# Patient Record
Sex: MALE | Race: WHITE | NOT HISPANIC OR LATINO | URBAN - METROPOLITAN AREA
[De-identification: names, ages, dates, MRNs, and addresses within clinical notes are randomized per-mention and may not be internally consistent; named-entity substitution may affect disease eponyms.]

---

## 2021-03-03 NOTE — IMPRESSION/PLAN
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
s/p focal laser OD Plan: He complains of worse vision OD. However, he's doing very well today. He has had recurrent CME OD after 9 weeks in the past. OCT confirms no edema OU. We discussed the findings and natural history. Based on today's findings, I recommend treatment to reduce the risk of vision loss. He is not interested in clinical trials. Eylea injected OU without complication after discussing the R/CAMPOS/ in detail. We will avoid 9 weeks for now. thanks [place] RTC 8 weeks  OCT OU; eylea OU

## 2021-06-23 NOTE — IMPRESSION/PLAN
Impression: Type 2 diabetes mellitus w/ moderate nonproliferative diabetic retinopathy w/ macular edema of bilateral eyes: S83.4242.
s/p focal laser OD Plan: He feels his vision is unchanged. He's doing very well today. He has had recurrent CME OD after 9 weeks in the past. OCT confirms no edema OU. We discussed the findings and natural history. I recommend treatment to reduce the risk of vision loss. He is not interested in clinical trials. Eylea injected OU without complication after discussing the R/CAMPOS/ in detail. He wants to try to extend the interval again. [facility]  [facility]  [facility]U; poss eylea OU

## 2021-08-25 NOTE — IMPRESSION/PLAN
Impression: Type 2 diabetes mellitus w/ moderate nonproliferative diabetic retinopathy w/ macular edema of bilateral eyes: C95.8961.
s/p focal laser OD Plan: He complains of worse vision OD and has slight recurrent CME OD after 9 weeks. He has had recurrent CME OD after 9 weeks in the past as well. OCT confirms mild CME OD and no edema OS. We discussed the findings and natural history. Based on today's findings, I recommend treatment to reduce the risk of vision loss. Eylea injected OU without complication after discussing the R/CAMPOS/ in detail. We will avoid 9 weeks for now. thanks [place] RTC 7-8  weeks OCT OU; eylea OU

## 2021-12-15 NOTE — IMPRESSION/PLAN
Impression: Type 2 diabetes mellitus w/ moderate nonproliferative diabetic retinopathy w/ macular edema of bilateral eyes: W42.5637.
s/p focal laser OD Plan: He complains of blurry vision OD. However, he's doing very well from a retina standpoint. He has had recurrent CME OD after 9 weeks in the past. OCT confirms trace CME OD and no edema OS. We discussed the findings and natural history. Based on today's findings, I recommend treatment to reduce the risk of vision loss. Eylea injected OU without complication after discussing the R/CAMPOS/ in detail. We will avoid 9 weeks for now. He will see you about his cataracts and ocular hypertension. thanks Moe [facility] 7-8  weeks OCT OU; eylea OU

## 2022-04-06 NOTE — IMPRESSION/PLAN
Impression: Type 2 diabetes mellitus w/ moderate nonproliferative diabetic retinopathy w/ macular edema of bilateral eyes: F52.9695.
s/p focal laser OD Plan: He feels his vision is unchanged. He has had recurrent CME OD after 9 weeks in the past but wants to try to extend again. OCT confirms no edema OU. We discussed the findings and natural history. I recommend treatment to reduce the risk of vision loss. Eylea injected OU without complication after discussing the R/CAMPOS/ in detail. He will see you about his cataracts and ocular hypertension. thanks Moe [facility] 9 weeks OCT OU; poss eylea OU

## 2022-06-08 NOTE — IMPRESSION/PLAN
Impression: Type 2 diabetes mellitus w/ moderate nonproliferative diabetic retinopathy w/ macular edema of bilateral eyes: D84.3603.
s/p focal laser OD Plan: He complains his vision OU is filmy. However, he continues to do well. He has had recurrent CME OD after 9 weeks in the past but his last injection was 9 weeks ago, and he wants to try to extend again. OCT confirms no edema OU. We discussed the findings and natural history. Based on today's findigns, I recommend treatment to reduce the risk of vision loss. Eylea injected OU without complication after discussing the R/CAMPOS/ in detail. He will see you about his cataracts
thanks Moe [facility] 10 weeks OCT OU; poss eylea OU

## 2022-08-10 NOTE — IMPRESSION/PLAN
Impression: Type 2 diabetes mellitus w/ moderate nonproliferative diabetic retinopathy w/ macular edema of bilateral eyes: C46.5332.
s/p focal laser OD Plan: He feels his vision is stable. He has had recurrent CME OD after 9 weeks in the past but his last two injections were 9 weeks ago, and he wants to try to extend again. OCT confirms no edema OU. We discussed the findings and natural history. I recommend treatment to reduce the risk of vision loss. Eylea injected OU without complication after discussing the R/CAMPOS/ in detail. He will see you about his cataracts vs refraction
thanks Moe [facility] 10-11 weeks OCT OU; poss eylea OU

## 2022-10-19 NOTE — IMPRESSION/PLAN
Impression: Type 2 diabetes mellitus w/ moderate nonproliferative diabetic retinopathy w/ macular edema of bilateral eyes: C04.0516.
s/p focal laser OD Plan: He complains of blurry vision OU in the mornings and difficulty seeing faces. He has slight recurrent edema OS today after 10 weeks. He has had recurrent CME OD after 9 weeks in the past. OCT confirms trace edema OD and mild edema OS. We discussed the findings and natural history. Based on today's findings, I recommend treatment to reduce the risk of vision loss. Eylea injected OU without complication after discussing the R/CAMPOS/ in detail. He will see you about his cataracts vs refraction
thanks Moe [facility] 2 months OCT OU; sade OU

## 2023-01-09 ENCOUNTER — OFFICE VISIT (OUTPATIENT)
Dept: URBAN - METROPOLITAN AREA CLINIC 42 | Facility: CLINIC | Age: 63
End: 2023-01-09
Payer: MEDICAID

## 2023-01-09 DIAGNOSIS — H25.813 COMBINED FORMS OF AGE-RELATED CATARACT, BILATERAL: ICD-10-CM

## 2023-01-09 DIAGNOSIS — H52.13 MYOPIA, BILATERAL: ICD-10-CM

## 2023-01-09 DIAGNOSIS — E11.3313 TYPE 2 DIABETES MELLITUS W/ MODERATE NONPROLIFERATIVE DIABETIC RETINOPATHY W/ MACULAR EDEMA OF BILATERAL EYES: Primary | ICD-10-CM

## 2023-01-09 PROCEDURE — 99204 OFFICE O/P NEW MOD 45 MIN: CPT

## 2023-01-09 ASSESSMENT — VISUAL ACUITY
OS: 20/30
OD: 20/40

## 2023-01-09 ASSESSMENT — INTRAOCULAR PRESSURE
OS: 14
OD: 14

## 2023-01-09 NOTE — IMPRESSION/PLAN
Impression: Combined forms of age-related cataract, bilateral: H25.813. Plan: Patient educated on condition. Significant decrease in vision and difficulty with activities of daily living. Risks and benefits of surgery vs waiting discussed. Recommend surgery OU. Patient is a candidate for/interested in monofocal, astigmatism correction, toric IOL, LENSX, ORA. Outcomes of surgery limitations include: h/o DME Schedule Cataract Evaluation with Dr. Yudith Zhou.

## 2023-01-09 NOTE — IMPRESSION/PLAN
Impression: Type 2 diabetes mellitus w/ moderate nonproliferative diabetic retinopathy w/ macular edema of bilateral eyes: Z59.8440. Plan: Patient educated on condition. Informed that DM is the #1 form of preventable blindness in the [address]. Continue strict blood sugar control with PCP. Continue retina care with [facility].

## 2023-02-06 NOTE — IMPRESSION/PLAN
Impression: Combined forms of age-related cataract, bilateral: H25.813. Plan: Discussed cataract surgery diagnosis with patient. Discussed risk and benefits Including infection, retinal detachment, droopy eye lid, swelling, bleeding, loss of vision and double vision. Patient understands may still need glasses for best corrected vision. Recommend OD first then OS. Candidate for LenSx, ORA and standard IOL only, if pt wishes. Non-candidate for multifocal IOL due to retina pathology OU, s/p eyelea inj OU. OD AIM: PLANO         OS AIM: PLANO Pt will need gtts upon decision, next appt with counselor. Pt will need NSAID gtts before and after sx. IOL master, Arthur Labor, [place] and OCT MAC were completed today.

## 2023-02-06 NOTE — IMPRESSION/PLAN
Impression: Type 2 diabetes mellitus w/ moderate nonproliferative diabetic retinopathy w/ macular edema of bilateral eyes: V06.6845. Plan: Diabetes type II: moderate background diabetic retinopathy, no signs of neovascularization noted. Will refer patient for a retinal consult to determine if treatment is necessary. Discussed ocular and systemic benefits of maintaining blood sugar control. Continue Retina care with [facility].

## 2023-03-02 NOTE — IMPRESSION/PLAN
Impression: S/P 44788 Cataract Extraction by phacoemulsification with IOL placement; TRYPAN OD - 1 Day. Encounter for surgical aftercare following surgery on a sense organ  Z48.810. Post operative instructions reviewed - Condition is improving - Plan: --Continue Prednisolone acetate 1%, Ketorolac and Ofloxacin as directed. Explained to pt and wife gtts instructions.

## 2023-03-06 NOTE — IMPRESSION/PLAN
Impression: Combined forms of age-related cataract, left eye: H25.812. Plan: Advised patient of condition. Risks, benefits, and alternatives of cataract surgery discussed. Patient elects to proceed Patient elects to proceed with 2nd eye cataract extraction, OS. Patient elects to proceed with ALL standard procedure and standard IOL OS Target PLANO OS Pt has Ofloxacin and Prednisolone, still waiting Ketorolac.

## 2023-03-06 NOTE — IMPRESSION/PLAN
Impression: S/P 73695 Cataract Extraction by phacoemulsification with IOL placement; TRYPAN OD - 5 Days. Encounter for surgical aftercare following surgery on a sense organ  Z48.810.  Excellent post op course   Post operative instructions reviewed - Condition is improving - Plan: --Continue ocuflox, pred as directed

## 2023-03-30 NOTE — IMPRESSION/PLAN
Impression: S/P Cataract Extraction by phacoemulsification with IOL placement OS - 1 Day. Presence of intraocular lens  Z96.1.  Excellent post op course   Post operative instructions reviewed - Condition is improving - Plan: Follow up in 1 week --Continue Prednisolone acetate 1%, ofloxacin and keterolac as directed

## 2023-04-03 NOTE — IMPRESSION/PLAN
Impression: S/P Cataract Extraction by phacoemulsification with IOL placement OS - 5 Days. Presence of intraocular lens  Z96.1. Plan: -- Continue prednisolone and ofloxacin as directed Continue care with Dr. Rodney Schroeder PRN follow up here

## 2023-04-19 NOTE — IMPRESSION/PLAN
Impression: Type 2 diabetes mellitus w/ moderate nonproliferative diabetic retinopathy w/ macular edema of bilateral eyes: X83.5784.
s/p focal laser OD Plan: He has slight recurrent edema OD today after recent CE/IOL OU. He has had recurrent CME OD after 9 weeks in the past. OCT confirms mild edema OD and no edema OS. We discussed the findings and natural history. Based on today's findings, I recommend treatment to reduce the risk of vision loss. Eylea injected OU without complication after discussing the R/CAMPOS/ in detail. He will start PF/ketorolac QID OD x1 month then taper off. 
[facility]; eylea OU

## 2023-08-10 NOTE — IMPRESSION/PLAN
Impression: Type 2 diabetes mellitus w/ moderate nonproliferative diabetic retinopathy w/ macular edema of bilateral eyes: V94.0038.
s/p focal laser OD Plan: He complains of declining vision and has recurrent CME OD after 9 weeks today. He has had recurrent CME OU after extending to 6 weeks with lucentis. Ovearall he has done will with monthly lucentis 0.3. OCT confirms severe CME OD and no edema OS. Based on today's findings, I recommend treatment to reduce the risk of vision loss. We discussed avastin, lucentis/eylea, ozurdex, clinical trials, and focal laser. He is not interested in clinical trials. Eylea injected OU without complication after discussing the R/CAMPOS/ in detail. We will treat/extend. thanks Moe [facility] 7 weeks  OCT OU; eylea OU oriented to person, place and time